# Patient Record
Sex: MALE | Race: WHITE | NOT HISPANIC OR LATINO | URBAN - NONMETROPOLITAN AREA
[De-identification: names, ages, dates, MRNs, and addresses within clinical notes are randomized per-mention and may not be internally consistent; named-entity substitution may affect disease eponyms.]

---

## 2022-06-17 ENCOUNTER — NEW PATIENT (OUTPATIENT)
Dept: URBAN - NONMETROPOLITAN AREA CLINIC 1 | Facility: CLINIC | Age: 77
End: 2022-06-17

## 2022-06-17 DIAGNOSIS — H40.033: ICD-10-CM

## 2022-06-17 DIAGNOSIS — H52.4: ICD-10-CM

## 2022-06-17 DIAGNOSIS — H25.813: ICD-10-CM

## 2022-06-17 PROCEDURE — 92015 DETERMINE REFRACTIVE STATE: CPT

## 2022-06-17 PROCEDURE — 92004 COMPRE OPH EXAM NEW PT 1/>: CPT

## 2022-06-17 PROCEDURE — 92020 GONIOSCOPY: CPT

## 2022-06-17 ASSESSMENT — VISUAL ACUITY
OD_CC: 20/40
OS_CC: 20/30-1
OD_PH: 20/20-1

## 2022-06-17 ASSESSMENT — TONOMETRY
OD_IOP_MMHG: 15
OS_IOP_MMHG: 15

## 2022-11-09 NOTE — PATIENT DISCUSSION
Tweak to distance OS and a change to near.  Patient had indicated that near had become more problematic.

## 2022-12-21 ENCOUNTER — CONSULTATION/EVALUATION (OUTPATIENT)
Dept: URBAN - NONMETROPOLITAN AREA CLINIC 1 | Facility: CLINIC | Age: 77
End: 2022-12-21

## 2022-12-21 PROCEDURE — 99214 OFFICE O/P EST MOD 30 MIN: CPT

## 2022-12-21 ASSESSMENT — TONOMETRY
OD_IOP_MMHG: 16
OS_IOP_MMHG: 16

## 2022-12-21 ASSESSMENT — VISUAL ACUITY
OU_SC: 20/63
OS_BAT: 20/200
OD_BAT: 20/200
OS_CC: 20/100-1
OD_SC: 20/63
OU_CC: 20/30
OS_AM: 20/30
OU_CC: 20/40
OU_SC: 20/40-2
OD_CC: 20/40
OD_PAM: 20/20
OS_SC: 20/63
OS_PH: 20/50

## 2023-01-24 ENCOUNTER — PRE-OP/H&P (OUTPATIENT)
Dept: URBAN - NONMETROPOLITAN AREA CLINIC 1 | Facility: CLINIC | Age: 78
End: 2023-01-24

## 2023-01-24 VITALS
SYSTOLIC BLOOD PRESSURE: 114 MMHG | BODY MASS INDEX: 25.03 KG/M2 | WEIGHT: 136 LBS | DIASTOLIC BLOOD PRESSURE: 94 MMHG | HEART RATE: 78 BPM | HEIGHT: 62 IN

## 2023-01-24 DIAGNOSIS — Z01.818: ICD-10-CM

## 2023-01-24 DIAGNOSIS — H40.033: ICD-10-CM

## 2023-01-24 PROCEDURE — 99499 UNLISTED E&M SERVICE: CPT

## 2023-01-24 ASSESSMENT — VISUAL ACUITY
OS_PH: 20/50
OD_PAM: 20/20
OS_CC: 20/100
OS_AM: 20/30
OD_BAT: 20/200
OD_CC: 20/40
OS_SC: 20/63
OS_BAT: 20/200
OD_SC: 20/63